# Patient Record
Sex: FEMALE
[De-identification: names, ages, dates, MRNs, and addresses within clinical notes are randomized per-mention and may not be internally consistent; named-entity substitution may affect disease eponyms.]

---

## 2018-07-12 ENCOUNTER — CHARTING TRANS (OUTPATIENT)
Dept: OTHER | Age: 78
End: 2018-07-12

## 2024-05-18 ENCOUNTER — HOSPITAL ENCOUNTER (EMERGENCY)
Facility: CLINIC | Age: 84
Discharge: HOME | End: 2024-05-18
Admitting: PHYSICIAN ASSISTANT
Payer: COMMERCIAL

## 2024-05-18 VITALS
OXYGEN SATURATION: 96 % | SYSTOLIC BLOOD PRESSURE: 156 MMHG | HEART RATE: 66 BPM | BODY MASS INDEX: 19.62 KG/M2 | RESPIRATION RATE: 16 BRPM | TEMPERATURE: 97.2 F | HEIGHT: 67 IN | WEIGHT: 125 LBS | DIASTOLIC BLOOD PRESSURE: 72 MMHG

## 2024-05-18 DIAGNOSIS — S01.511A LIP LACERATION, INITIAL ENCOUNTER: ICD-10-CM

## 2024-05-18 PROCEDURE — 99283 EMERGENCY DEPT VISIT LOW MDM: CPT

## 2024-05-18 PROCEDURE — 250N000009 HC RX 250: Performed by: PHYSICIAN ASSISTANT

## 2024-05-18 PROCEDURE — 12011 RPR F/E/E/N/L/M 2.5 CM/<: CPT

## 2024-05-18 RX ORDER — GINSENG 100 MG
CAPSULE ORAL ONCE
Status: COMPLETED | OUTPATIENT
Start: 2024-05-18 | End: 2024-05-18

## 2024-05-18 RX ADMIN — BACITRACIN: 500 OINTMENT TOPICAL at 12:57

## 2024-05-18 ASSESSMENT — COLUMBIA-SUICIDE SEVERITY RATING SCALE - C-SSRS
2. HAVE YOU ACTUALLY HAD ANY THOUGHTS OF KILLING YOURSELF IN THE PAST MONTH?: NO
1. IN THE PAST MONTH, HAVE YOU WISHED YOU WERE DEAD OR WISHED YOU COULD GO TO SLEEP AND NOT WAKE UP?: NO
6. HAVE YOU EVER DONE ANYTHING, STARTED TO DO ANYTHING, OR PREPARED TO DO ANYTHING TO END YOUR LIFE?: NO

## 2024-05-18 ASSESSMENT — ACTIVITIES OF DAILY LIVING (ADL): ADLS_ACUITY_SCORE: 35

## 2024-05-18 NOTE — ED PROVIDER NOTES
EMERGENCY DEPARTMENT ENCOUNTER   NAME: Jessica Moran ; AGE: 83 year old female ; YOB: 1940 ; MRN: [id number] ; PCP: System, Provider Not In     Evaluation Date & Time: 5/18/2024 12:16 PM    ED Provider: Dariela Boudreaux PA-C    CHIEF COMPLAINT     Fall and Lip Laceration      FINAL ASSESSMENT       ICD-10-CM    1. Lip laceration, initial encounter  S01.511A           ED COURSE, MEDICAL DECISION MAKING, PLAN     ED course     11:40 AM: Evaluated patient. Performed physical exam. Plan for suture repair.   12:44 PM: Completed suture repair. Discharge plans discussed.     ______________________________________________________________________    Jessica Moran is a 83 year old female with pertinent medical history of HTN, hypothyroidism, history of breast cancer presenting for lip laceration and that occurred shortly prior to presentation.  States that she had a mechanical fall ending directly on her face.  Chipped her front tooth and has a through and through laceration to the upper lip.  No other concerns.  Not on blood thinning medication.    Exam reveals a 1.5 cm laceration just above her right lip with some extension down over the vermilion border onto the actual lip.  This is a through and through.  The inner lip appears to have about a 0.5 cm laceration.  #8 is cracked and slightly loose.  Really has no facial pain on palpation.  No obvious head trauma.  The rest of the exam is unremarkable.  Pressure elevated to 164/81, but otherwise vitally normal.    Deferred CT imaging of the head and cervical spine if she is not having any headache, nausea, vomiting, vision changes.  She did not have LOC.  She is not on a blood thinner.  She does not have any external signs of trauma to her head.  She also has no midline cervical spine tenderness.  Full range of motion present.    Also deferred CT imaging of the facial bones as she has no palpable tenderness and no deformities noted.    Overall this appears  to be an laceration with a chipped tooth.    The wound was cleaned out and repaired as discussed below under procedures.  The wound was thoroughly explored and flushed out prior to repair with fast-absorbing suture.    Patient is working on getting an appointment with her dentist regarding the dental trauma.  She is from out of town and states she will arrange appropriate follow-up when she gets back.    We discussed indications for sooner reevaluation back in the ER.    She expresses understanding and is agreeable with the plan and will discharged home in good condition.    ______________________________________________________________________    *All pertinent lab & imaging studies independently reviewed. (See chart for details)   *Discussed the results of all the tests and plan with patient and family/guardians.   *All questions were answered.   *The patient and/or family/guardian acknowledged understanding and was agreeable with the care plan.      HISTORY OF PRESENT ILLNESS   Patient information was obtained from: Patient and patient's    Use of Intrepreter: N/A     Jessica Moran is a 83 year old female with a pertinent history of HTN, hypothyroidism, hx breast cancer who presents to the ED by means of walk in with her  and son for evaluation of a lip laceration that occurred shortly prior to presentation.     She states this was a mechanical fall. She denied having any shortness of breath, dizziness, or shortness of breath before the fall.     She states she did crack her front tooth and thinks it is a bit loose.     No headache, dizziness, vision changes, nausea, vomiting.     Last tetanus shot was in 2017.    No other concerns.       MEDICAL HISTORY     No past medical history on file.    No past surgical history on file.    No family history on file.         No current outpatient medications on file.        PHYSICAL EXAM     First Vitals:  Patient Vitals for the past 24 hrs:   BP Temp Temp  "src Pulse Resp SpO2 Height Weight   05/18/24 1258 (!) 156/72 97.2  F (36.2  C) Oral 66 16 96 % -- --   05/18/24 1135 (!) 164/81 97.1  F (36.2  C) Temporal 68 16 98 % 1.702 m (5' 7\") 56.7 kg (125 lb)         PHYSICAL EXAM:   Constitutional: No acute distress.  Neuro: Awake and alert. No focal deficits.  Psych: Calm and cooperative.  Head: Normocephalic.  Eyes: No periocular tenderness.  PERRL. EOMI. Conjunctivae clear.      Nose: No nose trauma.  Mouth: 1.5 cm laceration just above her upper lip.  There is very small extension over the vermilion border onto the lip itself.  It is somewhat stellate in shape.  It is a through and through lip injury.  The inner lip laceration is about 0.5 cm in length.  No active bleeding appreciated.  Tooth #8 is broken and appears a little loose.    Neck: FROM. No pain over the cervical spine or paraspinal musculature.   Cardio: Regular rate. Adequate perfusion to extremities.   Pulmonary: Oxygenating well on RA. No labored breathing.  Back: Appears to move freely. No pain over the thoracic/lumbar spine.    Upper extremities: Moves freely.   Lower extremities: Moves freely.   Skin: See above normal.  All other skin appears natural color, warm, dry, intact.       RESULTS     LAB:  All pertinent labs reviewed and interpreted  Labs Ordered and Resulted from Time of ED Arrival to Time of ED Departure - No data to display    RADIOLOGY:  No orders to display       ECG:    N/A    PROCEDURES     PROCEDURE: Laceration Repair   INDICATIONS: Laceration   PROCEDURE PROVIDER: Dariela Boudreaux PA-C   SITE: Upper lip (outer and inner lip)    TYPE/SIZE: complex, clean, and no foreign body visualized  2 cm (total length)   FUNCTIONAL ASSESSMENT: Distal sensation, circulation, and motor intact   MEDICATION: 1.5 mLs of 1% Lidocaine without epinephrine   PREPARATION: irrigation with Normal saline   DEBRIDEMENT: no debridement and wound explored, no foreign body found   CLOSURE:  Superficial layer closed " with 8 stitches of 5-0 Fast Absorbing simple interrupted    Total number of sutures/staples placed: 8                MEDICAL DECISION MAKING:  Obtained supplemental history:Supplemental history obtained?: Family Member/Significant Other  Reviewed external records: External records reviewed?: No  Care impacted by chronic illness:Hypertension  Care significantly affected by social determinants of health:N/A  Did you consider but not order tests?: Work up considered but not performed and documented in chart, if applicable  Did you interpret images independently?: Independent interpretation of ECG and images noted in documentation, when applicable.  Consultation discussion with other provider:Did you involve another provider (consultant, , pharmacy, etc.)?: No  Discharge. No recommendations on prescription strength medication(s). See documentation for any additional details.      FINAL IMPRESSION:    ICD-10-CM    1. Lip laceration, initial encounter  S01.511A             MEDICATIONS GIVEN IN THE EMERGENCY DEPARTMENT:  Medications   bacitracin ointment ( Topical $Given 5/18/24 1257)         NEW PRESCRIPTIONS STARTED AT TODAY'S ED VISIT:  There are no discharge medications for this patient.           Some or all of this documentation has been completed using dictation software and mild grammatical errors may be present. Please contact me with any concerns regarding this.       Dariela Boudreaux PA-C  Emergency Medicine   [facility] EMERGENCY ROOM       Dariela Boudreaux PA-C  05/18/24 0428

## 2024-05-18 NOTE — ED TRIAGE NOTES
Pt states tripped on a curb and fell down and hit sidewalk, denies LOC. Pt has laceration to top upper lip. Pt was sent here from urgent care due to needing to see plastic surgeon. Denies blood thinners. Pt thinks may have chipped a few teeth, denies other injuries.

## 2024-05-18 NOTE — DISCHARGE INSTRUCTIONS
Consume a soft diet over the next couple of days.   You can apply a small amount of bacitracin or triple antibiotic ointment to the affected area once or twice daily over the next 2 to 3 days.  You can use an ice pack over the area to help with swelling and pain.  Can also suck on ice cubes to help with swelling and pain to the inner upper lip.  I would recommend doing salt water mouth rinses after eating.  You can also use Tylenol to help with pain.  Do not hesitate to return to the ER for any new or worsening symptoms.  Make sure to get in with your dentist regarding the dental trauma.

## 2024-05-18 NOTE — ED TRIAGE NOTES
Triage Assessment (Adult)       Row Name 05/18/24 1134          Triage Assessment    Airway WDL WDL        Respiratory WDL    Respiratory WDL WDL        Peripheral/Neurovascular WDL    Peripheral Neurovascular WDL WDL

## 2024-07-28 ENCOUNTER — HEALTH MAINTENANCE LETTER (OUTPATIENT)
Age: 84
End: 2024-07-28

## 2025-08-10 ENCOUNTER — HEALTH MAINTENANCE LETTER (OUTPATIENT)
Age: 85
End: 2025-08-10